# Patient Record
Sex: FEMALE
[De-identification: names, ages, dates, MRNs, and addresses within clinical notes are randomized per-mention and may not be internally consistent; named-entity substitution may affect disease eponyms.]

---

## 2020-06-15 PROBLEM — Z00.00 ENCOUNTER FOR PREVENTIVE HEALTH EXAMINATION: Status: ACTIVE | Noted: 2020-06-15

## 2020-06-19 ENCOUNTER — APPOINTMENT (OUTPATIENT)
Dept: THORACIC SURGERY | Facility: CLINIC | Age: 60
End: 2020-06-19
Payer: MEDICAID

## 2020-06-19 VITALS
RESPIRATION RATE: 18 BRPM | SYSTOLIC BLOOD PRESSURE: 145 MMHG | WEIGHT: 120 LBS | HEIGHT: 63 IN | OXYGEN SATURATION: 99 % | TEMPERATURE: 97.3 F | HEART RATE: 68 BPM | DIASTOLIC BLOOD PRESSURE: 80 MMHG | BODY MASS INDEX: 21.26 KG/M2

## 2020-06-19 DIAGNOSIS — R91.1 SOLITARY PULMONARY NODULE: ICD-10-CM

## 2020-06-19 PROCEDURE — 99204 OFFICE O/P NEW MOD 45 MIN: CPT

## 2020-06-19 NOTE — PHYSICAL EXAM
[General Appearance - Alert] : alert [General Appearance - In No Acute Distress] : in no acute distress [General Appearance - Well Nourished] : well nourished [Outer Ear] : the ears and nose were normal in appearance [General Appearance - Well Developed] : well developed [Sclera] : the sclera and conjunctiva were normal [Hearing Threshold Finger Rub Not [place]] : hearing was normal [Neck Appearance] : the appearance of the neck was normal [Neck Cervical Mass (___cm)] : no neck mass was observed [Jugular Venous Distention Increased] : there was no jugular-venous distention [Auscultation Breath Sounds / Voice Sounds] : lungs were clear to auscultation bilaterally [Exaggerated Use Of Accessory Muscles For Inspiration] : no accessory muscle use [Apical Impulse] : the apical impulse was normal [Examination Of The Chest] : the chest was normal in appearance [2+] : right 2+ [Abdomen Soft] : soft [Cervical Lymph Nodes Enlarged Posterior Bilaterally] : posterior cervical [Abdomen Tenderness] : non-tender [No CVA Tenderness] : no ~M costovertebral angle tenderness [Cervical Lymph Nodes Enlarged Anterior Bilaterally] : anterior cervical [Abnormal Walk] : normal gait [Skin Color & Pigmentation] : normal skin color and pigmentation [Musculoskeletal - Swelling] : no joint swelling seen [] : no rash [Skin Lesions] : no skin lesions [Oriented To Time, Place, And Person] : oriented to person, place, and time [No Focal Deficits] : no focal deficits

## 2020-06-24 NOTE — ASSESSMENT
[FreeTextEntry1] : 59 year old female, Cantonese speaking, nonsmoker, pmhx DM, hypothyroidism, breast nodule, HLD, uterine myoma referred by Dr Antione Gotti for RLL micronodules. She presents today for an initial evaluation and second opinion. Patient reports intermittent LLQ pain related to uterine fibroids and was scheduled to undergo surgery recently, which was  postponed due to COVID. She also complains of occasional bilateral shoulder pain with unknown triggers. She is concerned for lung metastases with no known hx of cancer diagnosis. Denies recent illnesses, fever/chills. Denies SOB, cough, hemoptysis, chest discomfort.\par \par CT Chest 4/16/20 \par - hepatic hemangioma\par - uterine myomas\par - previously-demonstrated cystic lesion in the pancreatic tail no longer evident\par - RLL tree-in-bud nodules, compatible with small airways infection\par \par CT Chest 5/22/20\par - Stable RLL micronodules most suggestive of small airways infectious/inflammatory\par - No focal consolidation or pleural effusion \par \par No surgical intervention or biopsy warranted for micronodules. Lung findings on recent CT appear benign, infectious/inflammatory and are unlikely to cause shoulder pain. Also reviewed 2017 CT films - unremarkable lung findings. \par \par I have reviewed the patient's medical records and diagnostic images at the time of this office consultation and have made the following recommendation.\par Plan:\par 1. RTC PRN\par 2. Follow-up with PCP

## 2020-06-24 NOTE — END OF VISIT
[FreeTextEntry3] : I, LION MERCEDES , am scribing for and in the presence of RAFAEL SHETH the following sections: history of present illness, past medical/family/surgical/family/social history, review of systems, vital signs, physical exam, and disposition.\par

## 2020-06-24 NOTE — CONSULT LETTER
[Consult Closing:] : Thank you very much for allowing me to participate in the care of this patient.  If you have any questions, please do not hesitate to contact me. [FreeTextEntry3] : Remigio Remy MD\par Professor, Cardiovascular & Thoracic Surgery\par [facility]\par Director of [facility] \par Director of Thoracic Surgery, [facility]\par [facility]\par [address]\par [facility] 4th Floor\par [place] [id number]\par Phone: [phone]\par Fax: [phone]

## 2020-06-24 NOTE — HISTORY OF PRESENT ILLNESS
[FreeTextEntry1] : 59 year old female, Cantonese speaking, nonsmoker, pmhx DM, hypothyroidism, breast nodule, HLD, uterine myoma referred by Dr Antione Gotti for RLL micronodules. She presents today for an initial evaluation and second opinion. Patient reports intermittent LLQ pain related to uterine fibroids and was scheduled to undergo surgery recently, which was  postponed due to COVID. She also complains of occasional bilateral shoulder pain with unknown triggers. She is concerned for lung metastases with no known hx of cancer diagnosis. Denies recent illnesses, fever/chills. Denies SOB, cough, hemoptysis, chest discomfort.\par \par CT Chest 4/16/20 \par - hepatic hemangioma\par - uterine myomas\par - previously-demonstrated cystic lesion in the pancreatic tail no longer evident\par - RLL tree-in-bud nodules, compatible with small airways infection\par \par CT Chest 5/22/20\par - Stable RLL micronodules most suggestive of small airways infectious/inflammatory\par - No focal consolidation or pleural effusion

## 2020-10-20 ENCOUNTER — RESULT REVIEW (OUTPATIENT)
Age: 60
End: 2020-10-20